# Patient Record
Sex: MALE | Race: WHITE | NOT HISPANIC OR LATINO | Employment: OTHER | URBAN - METROPOLITAN AREA
[De-identification: names, ages, dates, MRNs, and addresses within clinical notes are randomized per-mention and may not be internally consistent; named-entity substitution may affect disease eponyms.]

---

## 2023-04-05 ENCOUNTER — TELEPHONE (OUTPATIENT)
Dept: PRIMARY CARE | Facility: CLINIC | Age: 63
End: 2023-04-05
Payer: COMMERCIAL

## 2023-04-05 NOTE — TELEPHONE ENCOUNTER
Pt left a msg stating that he saw ortho, did PT, got an injection 3 weeks ago with no relief.  Dr. Ya said that he needs to see a back specialist.  The pt is asking for a referral to either Dr. Hemanth Prasad or Dr. Kaz Velasco at the [facility].

## 2023-04-07 DIAGNOSIS — M54.50 CHRONIC LOW BACK PAIN, UNSPECIFIED BACK PAIN LATERALITY, UNSPECIFIED WHETHER SCIATICA PRESENT: ICD-10-CM

## 2023-04-07 DIAGNOSIS — G89.29 CHRONIC LOW BACK PAIN, UNSPECIFIED BACK PAIN LATERALITY, UNSPECIFIED WHETHER SCIATICA PRESENT: ICD-10-CM

## 2023-04-17 DIAGNOSIS — F41.9 ANXIETY: Primary | ICD-10-CM

## 2023-04-17 RX ORDER — ESCITALOPRAM OXALATE 20 MG/1
TABLET ORAL
Qty: 90 TABLET | Refills: 3 | Status: SHIPPED | OUTPATIENT
Start: 2023-04-17 | End: 2024-04-10

## 2023-04-29 DIAGNOSIS — R10.13 EPIGASTRIC PAIN: ICD-10-CM

## 2023-05-01 RX ORDER — PANTOPRAZOLE SODIUM 40 MG/1
TABLET, DELAYED RELEASE ORAL
Qty: 90 TABLET | Refills: 2 | Status: SHIPPED | OUTPATIENT
Start: 2023-05-01 | End: 2024-03-08 | Stop reason: WASHOUT

## 2023-06-14 ENCOUNTER — APPOINTMENT (OUTPATIENT)
Dept: PRIMARY CARE | Facility: CLINIC | Age: 63
End: 2023-06-14
Payer: COMMERCIAL

## 2023-08-09 ENCOUNTER — HOSPITAL ENCOUNTER (OUTPATIENT)
Dept: DATA CONVERSION | Facility: HOSPITAL | Age: 63
Discharge: HOME | End: 2023-08-09

## 2023-08-09 DIAGNOSIS — M25.552 PAIN IN LEFT HIP: ICD-10-CM

## 2023-08-09 DIAGNOSIS — M87.052 IDIOPATHIC ASEPTIC NECROSIS OF LEFT FEMUR (MULTI): ICD-10-CM

## 2023-08-11 ENCOUNTER — HOSPITAL ENCOUNTER (OUTPATIENT)
Dept: DATA CONVERSION | Facility: HOSPITAL | Age: 63
Discharge: HOME | End: 2023-08-11

## 2023-08-11 DIAGNOSIS — M87.9 OSTEONECROSIS, UNSPECIFIED (MULTI): ICD-10-CM

## 2023-08-11 DIAGNOSIS — M87.052 IDIOPATHIC ASEPTIC NECROSIS OF LEFT FEMUR (MULTI): ICD-10-CM

## 2023-09-07 ENCOUNTER — TELEPHONE (OUTPATIENT)
Dept: PRIMARY CARE | Facility: CLINIC | Age: 63
End: 2023-09-07
Payer: COMMERCIAL

## 2023-09-07 NOTE — TELEPHONE ENCOUNTER
Pt left a msg stating that he is having a burning sensation on the top of his little toe.  Should he be concerned?  He does have a derm appt in December.

## 2023-11-07 ENCOUNTER — OFFICE VISIT (OUTPATIENT)
Dept: ORTHOPEDIC SURGERY | Facility: CLINIC | Age: 63
End: 2023-11-07
Payer: COMMERCIAL

## 2023-11-07 ENCOUNTER — ANCILLARY PROCEDURE (OUTPATIENT)
Dept: RADIOLOGY | Facility: CLINIC | Age: 63
End: 2023-11-07
Payer: COMMERCIAL

## 2023-11-07 VITALS — BODY MASS INDEX: 26.6 KG/M2 | HEIGHT: 71 IN | WEIGHT: 190 LBS

## 2023-11-07 DIAGNOSIS — M25.552 LEFT HIP PAIN: ICD-10-CM

## 2023-11-07 DIAGNOSIS — Z96.642 STATUS POST LEFT HIP REPLACEMENT: Primary | ICD-10-CM

## 2023-11-07 PROCEDURE — 1036F TOBACCO NON-USER: CPT | Performed by: ORTHOPAEDIC SURGERY

## 2023-11-07 PROCEDURE — 73502 X-RAY EXAM HIP UNI 2-3 VIEWS: CPT | Mod: LT,FY

## 2023-11-07 PROCEDURE — 73502 X-RAY EXAM HIP UNI 2-3 VIEWS: CPT | Mod: LEFT SIDE | Performed by: RADIOLOGY

## 2023-11-07 PROCEDURE — 99024 POSTOP FOLLOW-UP VISIT: CPT | Performed by: ORTHOPAEDIC SURGERY

## 2023-11-07 RX ORDER — ATORVASTATIN CALCIUM 10 MG/1
10 TABLET, FILM COATED ORAL DAILY
COMMUNITY

## 2023-11-07 RX ORDER — METOPROLOL SUCCINATE 50 MG/1
50 TABLET, EXTENDED RELEASE ORAL DAILY
COMMUNITY
End: 2024-01-11 | Stop reason: SDUPTHER

## 2023-11-07 ASSESSMENT — PAIN - FUNCTIONAL ASSESSMENT: PAIN_FUNCTIONAL_ASSESSMENT: NO/DENIES PAIN

## 2023-11-07 NOTE — PROGRESS NOTES
ORTHOPEDIC TOTAL JOINT  POST-OPERATIVE FOLLOW UP      ============================  IMPRESSION/PLAN:  ============================  63 y.o. male s/p Left Total Hip Replacement completed on 08/11/2023    PLAN:  -Patient is here 3 months out from his hip replacement.  Doing excellent at this time.  X-rays reviewed with him demonstrating stable implants.  Recommend he continue with activities as tolerated while using pain as a guide.  11 follow-up in 1 year megan of surgery with new x-rays      Shankar Torres presents today for follow up of joint replacement above. Pain controlled with current treatment plan. Patient has completed physical therapy. They are currently doing therapy at home on his own. They are currently ambulating with  no assistance .     Review of Systems:   Constitutional: See HPI for pain assessment, No significant weight loss, recent trauma. Denies fevers/chills  Cardiovascular: No chest pain, shortness of breath  Respiratory: No difficulty breathing, cough  Gastrointestinal: No nausea, vomiting, diarrhea, constipation  Musculoskeletal: Noted in HPI, no arthralgias   Integumentary: No rashes, easy bruising, redness   Neurological: no numbness or tingling in extremities, no gait disturbances     There is no problem list on file for this patient.      =================================  EXAM  =================================  GENERAL: A/Ox3, NAD. Appears healthy, well nourished  CARDIAC: regular rate  LUNGS: Breathing non-labored    MUSCULOSKELETAL:  Laterality: left Hip exam  - Strength: Abduction 5/5, Flexion 5/5  - Palpation: non TTP along surgical site  - Incision: healed  - EHL/PF/DF motor intact  - compartments soft, negative homans  - Gait: using no assistive device    NEUROVASCULAR:  - Neurovascular Status: sensation intact to light touch distally  - Capillary refill brisk at extremities, Bilateral dorsalis pedis pulse 2+     IMAGING: Multiple views of the left hip and pelvis reviewed today  and compared to prior films which demonstrates no signs of loosening or failure of left total of arthroplasty. X-rays were personally reviewed by me.  Radiology reports were reviewed by me as well, if available at the time.        Neil Vizcaino DO  Attending Surgeon  Joint Replacement and Adult Reconstructive Surgery  [place], [place]

## 2023-12-18 DIAGNOSIS — E78.5 HYPERLIPIDEMIA, UNSPECIFIED HYPERLIPIDEMIA TYPE: Primary | ICD-10-CM

## 2023-12-19 RX ORDER — ROSUVASTATIN CALCIUM 5 MG/1
5 TABLET, COATED ORAL DAILY
Qty: 90 TABLET | Refills: 0 | Status: SHIPPED | OUTPATIENT
Start: 2023-12-19 | End: 2024-03-18

## 2024-01-11 ENCOUNTER — TELEPHONE (OUTPATIENT)
Dept: CARDIOLOGY | Facility: CLINIC | Age: 64
End: 2024-01-11
Payer: COMMERCIAL

## 2024-01-11 DIAGNOSIS — I25.10 CORONARY ARTERY DISEASE INVOLVING NATIVE HEART, UNSPECIFIED VESSEL OR LESION TYPE, UNSPECIFIED WHETHER ANGINA PRESENT: Primary | ICD-10-CM

## 2024-01-11 RX ORDER — METOPROLOL SUCCINATE 50 MG/1
50 TABLET, EXTENDED RELEASE ORAL DAILY
Qty: 90 TABLET | Refills: 2 | Status: SHIPPED | OUTPATIENT
Start: 2024-01-11

## 2024-03-08 ENCOUNTER — OFFICE VISIT (OUTPATIENT)
Dept: PRIMARY CARE | Facility: CLINIC | Age: 64
End: 2024-03-08
Payer: COMMERCIAL

## 2024-03-08 VITALS
DIASTOLIC BLOOD PRESSURE: 88 MMHG | TEMPERATURE: 97.9 F | SYSTOLIC BLOOD PRESSURE: 138 MMHG | BODY MASS INDEX: 26.36 KG/M2 | WEIGHT: 189 LBS

## 2024-03-08 DIAGNOSIS — F41.9 ANXIETY: ICD-10-CM

## 2024-03-08 DIAGNOSIS — R20.2 PARESTHESIA OF RIGHT FOOT: Primary | ICD-10-CM

## 2024-03-08 DIAGNOSIS — E78.5 HYPERLIPIDEMIA, UNSPECIFIED HYPERLIPIDEMIA TYPE: ICD-10-CM

## 2024-03-08 DIAGNOSIS — I10 PRIMARY HYPERTENSION: ICD-10-CM

## 2024-03-08 DIAGNOSIS — R73.01 ELEVATED FASTING BLOOD SUGAR: ICD-10-CM

## 2024-03-08 DIAGNOSIS — I25.10 ARTERIOSCLEROSIS OF CORONARY ARTERY: ICD-10-CM

## 2024-03-08 DIAGNOSIS — Z12.11 ENCOUNTER FOR SCREENING FOR MALIGNANT NEOPLASM OF COLON: ICD-10-CM

## 2024-03-08 DIAGNOSIS — I47.29 NSVT (NONSUSTAINED VENTRICULAR TACHYCARDIA) (MULTI): ICD-10-CM

## 2024-03-08 DIAGNOSIS — Z00.00 WELL ADULT EXAM: ICD-10-CM

## 2024-03-08 PROCEDURE — 3079F DIAST BP 80-89 MM HG: CPT | Performed by: INTERNAL MEDICINE

## 2024-03-08 PROCEDURE — 1036F TOBACCO NON-USER: CPT | Performed by: INTERNAL MEDICINE

## 2024-03-08 PROCEDURE — 99214 OFFICE O/P EST MOD 30 MIN: CPT | Performed by: INTERNAL MEDICINE

## 2024-03-08 PROCEDURE — 3075F SYST BP GE 130 - 139MM HG: CPT | Performed by: INTERNAL MEDICINE

## 2024-03-08 ASSESSMENT — PATIENT HEALTH QUESTIONNAIRE - PHQ9
SUM OF ALL RESPONSES TO PHQ9 QUESTIONS 1 AND 2: 0
2. FEELING DOWN, DEPRESSED OR HOPELESS: NOT AT ALL
1. LITTLE INTEREST OR PLEASURE IN DOING THINGS: NOT AT ALL

## 2024-03-08 NOTE — PROGRESS NOTES
Subjective   Patient ID: Shankar Torres is a 63 y.o. male who presents for tingling in toes x months.    HPI     Overall well   #1 anxiety-remains an issue  #2 lipids-on treatment without difficulty.  Diet fair exercise  #3 leukopenia-no infections.  No fever chills night sweats.  #4 ifbs-increased sugar.  Increase alcohol.    Review of Systems  All systems reviewed and negative except as per history of present illness  Objective   /88   Temp 36.6 °C (97.9 °F)   Wt 85.7 kg (189 lb)   BMI 26.36 kg/m²     Physical Exam  Constitutional:       Appearance: Normal appearance.   Cardiovascular:      Rate and Rhythm: Normal rate and regular rhythm.      Pulses: Normal pulses.      Heart sounds: No murmur heard.     No gallop.   Pulmonary:      Effort: Pulmonary effort is normal. No respiratory distress.      Breath sounds: Normal breath sounds. No wheezing, rhonchi or rales.   Neurological:      Mental Status: He is alert.   Psychiatric:         Mood and Affect: Mood normal.         Behavior: Behavior normal.         Thought Content: Thought content normal.         Judgment: Judgment normal.         Assessment/Plan       Paraesthesia- reviewed.  Suspect tarsal tunnel.  f/u pending w/ podiatry.      #1 anxiety disorder- fair control. con't Lexapro to 20 mg.  Recommend patient work with psychiatry and psychology.  Reviewed.  Declines.  #2 hyperlipidemia-labs  #3 leukopenia-retest  #4 impaired fasting sugar- good. Diet and exercise stressed. Low sugar reviewed. Recheck   #5 CAD-mild. non-obstuctive. Clinically stable. Follow-up cardiology  #6 nonsustained V. tach-clinically stable. Follow-up cardiology  #7 ED-we will transition to generic treatment. Reviewed side effects of treatment. Reviewed interaction with nitroglycerin  #8 sclerosing kuzbmlgjjjtf-kfpgdm-gh GI. Clinically doing very well  #9 pelvic floor- con't to f/u w/ PT.   #10 floaters- f/u eyeMD  #11 VT- good, s/p ablation. f/u cards.  #12 insomnia-  reviewed. c/s melatonin   #13 dyspepsia- reviewed. Begin PPI. Consult GI. labs  #14 macrocytic anemia- labs today.     Reviewed importance of reducing/reviewed symptoms of withdrawal.     psa --> ordered.   colo 2014--> 2024.  Orders entered, reviewed importance of the patient  s/p shingrix 2/2 2018

## 2024-03-10 PROBLEM — E78.5 HYPERLIPIDEMIA: Status: ACTIVE | Noted: 2024-03-10

## 2024-03-10 PROBLEM — R73.01 ELEVATED FASTING BLOOD SUGAR: Status: ACTIVE | Noted: 2024-03-10

## 2024-03-10 PROBLEM — I10 HTN (HYPERTENSION): Status: ACTIVE | Noted: 2024-03-10

## 2024-03-10 PROBLEM — N52.9 ERECTILE DYSFUNCTION: Status: ACTIVE | Noted: 2024-03-10

## 2024-03-10 PROBLEM — I25.10 ARTERIOSCLEROSIS OF CORONARY ARTERY: Status: ACTIVE | Noted: 2024-03-10

## 2024-03-10 PROBLEM — F41.9 ANXIETY: Status: ACTIVE | Noted: 2024-03-10

## 2024-03-10 PROBLEM — M23.301 DEGENERATIVE TEAR OF LATERAL MENISCUS OF LEFT KNEE: Status: ACTIVE | Noted: 2024-03-10

## 2024-03-18 DIAGNOSIS — E78.5 HYPERLIPIDEMIA, UNSPECIFIED HYPERLIPIDEMIA TYPE: ICD-10-CM

## 2024-03-18 RX ORDER — ROSUVASTATIN CALCIUM 5 MG/1
TABLET, COATED ORAL
Qty: 90 TABLET | Refills: 3 | Status: SHIPPED | OUTPATIENT
Start: 2024-03-18

## 2024-04-09 DIAGNOSIS — F41.9 ANXIETY: ICD-10-CM

## 2024-04-10 RX ORDER — ESCITALOPRAM OXALATE 20 MG/1
TABLET ORAL
Qty: 90 TABLET | Refills: 3 | Status: SHIPPED | OUTPATIENT
Start: 2024-04-10

## 2024-04-23 ENCOUNTER — LAB (OUTPATIENT)
Dept: LAB | Facility: LAB | Age: 64
End: 2024-04-23
Payer: COMMERCIAL

## 2024-04-23 DIAGNOSIS — R20.2 PARESTHESIA OF RIGHT FOOT: ICD-10-CM

## 2024-04-23 DIAGNOSIS — Z00.00 WELL ADULT EXAM: ICD-10-CM

## 2024-04-23 LAB
ALT SERPL W P-5'-P-CCNC: 17 U/L (ref 10–52)
ANION GAP SERPL CALC-SCNC: 10 MMOL/L (ref 10–20)
BUN SERPL-MCNC: 11 MG/DL (ref 6–23)
CALCIUM SERPL-MCNC: 9.1 MG/DL (ref 8.6–10.3)
CHLORIDE SERPL-SCNC: 102 MMOL/L (ref 98–107)
CHOLEST SERPL-MCNC: 212 MG/DL (ref 0–199)
CHOLESTEROL/HDL RATIO: 2.4
CO2 SERPL-SCNC: 31 MMOL/L (ref 21–32)
CREAT SERPL-MCNC: 0.73 MG/DL (ref 0.5–1.3)
EGFRCR SERPLBLD CKD-EPI 2021: >90 ML/MIN/1.73M*2
ERYTHROCYTE [DISTWIDTH] IN BLOOD BY AUTOMATED COUNT: 12.7 % (ref 11.5–14.5)
EST. AVERAGE GLUCOSE BLD GHB EST-MCNC: 128 MG/DL
GLUCOSE SERPL-MCNC: 82 MG/DL (ref 74–99)
HBA1C MFR BLD: 6.1 %
HCT VFR BLD AUTO: 45.6 % (ref 41–52)
HDLC SERPL-MCNC: 89.1 MG/DL
HGB BLD-MCNC: 14.7 G/DL (ref 13.5–17.5)
LDLC SERPL CALC-MCNC: 104 MG/DL
MCH RBC QN AUTO: 32.9 PG (ref 26–34)
MCHC RBC AUTO-ENTMCNC: 32.2 G/DL (ref 32–36)
MCV RBC AUTO: 102 FL (ref 80–100)
NON HDL CHOLESTEROL: 123 MG/DL (ref 0–149)
NRBC BLD-RTO: 0 /100 WBCS (ref 0–0)
PLATELET # BLD AUTO: 304 X10*3/UL (ref 150–450)
POTASSIUM SERPL-SCNC: 4.6 MMOL/L (ref 3.5–5.3)
PSA SERPL-MCNC: 1.69 NG/ML
RBC # BLD AUTO: 4.47 X10*6/UL (ref 4.5–5.9)
SODIUM SERPL-SCNC: 138 MMOL/L (ref 136–145)
TRIGL SERPL-MCNC: 96 MG/DL (ref 0–149)
TSH SERPL-ACNC: 1.53 MIU/L (ref 0.44–3.98)
VIT B12 SERPL-MCNC: 209 PG/ML (ref 211–911)
VLDL: 19 MG/DL (ref 0–40)
WBC # BLD AUTO: 4.3 X10*3/UL (ref 4.4–11.3)

## 2024-04-23 PROCEDURE — 36415 COLL VENOUS BLD VENIPUNCTURE: CPT

## 2024-04-23 PROCEDURE — 84443 ASSAY THYROID STIM HORMONE: CPT

## 2024-04-23 PROCEDURE — 84460 ALANINE AMINO (ALT) (SGPT): CPT

## 2024-04-23 PROCEDURE — 80061 LIPID PANEL: CPT

## 2024-04-23 PROCEDURE — 83036 HEMOGLOBIN GLYCOSYLATED A1C: CPT

## 2024-04-23 PROCEDURE — 84153 ASSAY OF PSA TOTAL: CPT

## 2024-04-23 PROCEDURE — 80048 BASIC METABOLIC PNL TOTAL CA: CPT

## 2024-04-23 PROCEDURE — 82607 VITAMIN B-12: CPT

## 2024-04-23 PROCEDURE — 85027 COMPLETE CBC AUTOMATED: CPT

## 2024-05-10 ENCOUNTER — TELEPHONE (OUTPATIENT)
Dept: PRIMARY CARE | Facility: CLINIC | Age: 64
End: 2024-05-10
Payer: COMMERCIAL

## 2024-05-10 NOTE — TELEPHONE ENCOUNTER
Pt left a msg stating that he has an appt on 6/14 to discuss his B-12 and A1C.  He wanted to know if there was anything he was to do until his appt.

## 2024-06-03 ENCOUNTER — TELEPHONE (OUTPATIENT)
Dept: PRIMARY CARE | Facility: CLINIC | Age: 64
End: 2024-06-03
Payer: COMMERCIAL

## 2024-06-03 DIAGNOSIS — Z12.11 COLON CANCER SCREENING: ICD-10-CM

## 2024-06-14 ENCOUNTER — APPOINTMENT (OUTPATIENT)
Dept: PRIMARY CARE | Facility: CLINIC | Age: 64
End: 2024-06-14
Payer: COMMERCIAL

## 2024-06-14 VITALS
DIASTOLIC BLOOD PRESSURE: 84 MMHG | TEMPERATURE: 97.3 F | BODY MASS INDEX: 26.44 KG/M2 | SYSTOLIC BLOOD PRESSURE: 162 MMHG | WEIGHT: 189.6 LBS

## 2024-06-14 DIAGNOSIS — F41.9 ANXIETY: ICD-10-CM

## 2024-06-14 DIAGNOSIS — I47.29 NSVT (NONSUSTAINED VENTRICULAR TACHYCARDIA) (MULTI): ICD-10-CM

## 2024-06-14 DIAGNOSIS — I10 PRIMARY HYPERTENSION: ICD-10-CM

## 2024-06-14 DIAGNOSIS — E53.8 B12 DEFICIENCY: ICD-10-CM

## 2024-06-14 DIAGNOSIS — E78.5 HYPERLIPIDEMIA, UNSPECIFIED HYPERLIPIDEMIA TYPE: ICD-10-CM

## 2024-06-14 DIAGNOSIS — R73.01 ELEVATED FASTING BLOOD SUGAR: Primary | ICD-10-CM

## 2024-06-14 PROCEDURE — 99214 OFFICE O/P EST MOD 30 MIN: CPT | Performed by: INTERNAL MEDICINE

## 2024-06-14 PROCEDURE — 3079F DIAST BP 80-89 MM HG: CPT | Performed by: INTERNAL MEDICINE

## 2024-06-14 PROCEDURE — 1036F TOBACCO NON-USER: CPT | Performed by: INTERNAL MEDICINE

## 2024-06-14 PROCEDURE — 3077F SYST BP >= 140 MM HG: CPT | Performed by: INTERNAL MEDICINE

## 2024-06-14 RX ORDER — LANOLIN ALCOHOL/MO/W.PET/CERES
1000 CREAM (GRAM) TOPICAL DAILY
COMMUNITY

## 2024-06-14 RX ORDER — METFORMIN HYDROCHLORIDE 500 MG/1
500 TABLET ORAL
Qty: 200 TABLET | Refills: 3 | Status: SHIPPED | OUTPATIENT
Start: 2024-06-14 | End: 2025-07-19

## 2024-06-14 ASSESSMENT — PATIENT HEALTH QUESTIONNAIRE - PHQ9
1. LITTLE INTEREST OR PLEASURE IN DOING THINGS: NOT AT ALL
2. FEELING DOWN, DEPRESSED OR HOPELESS: NOT AT ALL
SUM OF ALL RESPONSES TO PHQ9 QUESTIONS 1 AND 2: 0

## 2024-06-14 NOTE — PROGRESS NOTES
Subjective   Patient ID: Shankar Torres is a 63 y.o. male who presents for Follow-up (Lab results).    HPI     Overall well   #1 anxiety-remains an issue  #2 lipids-on treatment without difficulty.  Diet fair exercise  #3 leukopenia-no infections.  No fever chills night sweats.  #4 ifbs-increased sugar.  Increase alcohol.    Review of Systems  All systems reviewed and negative except as per history of present illness  Objective   /84   Temp 36.3 °C (97.3 °F)   Wt 86 kg (189 lb 9.6 oz)   BMI 26.44 kg/m²     Physical Exam  Constitutional:       Appearance: Normal appearance.   Cardiovascular:      Rate and Rhythm: Normal rate and regular rhythm.      Pulses: Normal pulses.      Heart sounds: No murmur heard.     No gallop.   Pulmonary:      Effort: Pulmonary effort is normal. No respiratory distress.      Breath sounds: Normal breath sounds. No wheezing, rhonchi or rales.   Neurological:      Mental Status: He is alert.   Psychiatric:         Mood and Affect: Mood normal.         Behavior: Behavior normal.         Thought Content: Thought content normal.         Judgment: Judgment normal.       Lab Results   Component Value Date    WBC 4.3 (L) 04/23/2024    HGB 14.7 04/23/2024    HCT 45.6 04/23/2024     04/23/2024    CHOL 212 (H) 04/23/2024    TRIG 96 04/23/2024    HDL 89.1 04/23/2024    ALT 17 04/23/2024    AST 20 07/24/2023     04/23/2024    K 4.6 04/23/2024     04/23/2024    CREATININE 0.73 04/23/2024    BUN 11 04/23/2024    CO2 31 04/23/2024    TSH 1.53 04/23/2024    PSA 1.69 04/23/2024    INR 0.9 07/24/2023    HGBA1C 6.1 (H) 04/23/2024     Lab Results   Component Value Date    [id number] 209 (L) 04/23/2024         Assessment/Plan       Paraesthesia- reviewed.  Suspect tarsal tunnel.  f/u pending w/ podiatry.      #1 anxiety disorder- fair control. con't Lexapro to 20 mg.  Recommend patient work with psychiatry and psychology.  Reviewed.  Declines.  C/s Buspar - declines today.   #2  hyperlipidemia- good. f/u  cards  #3 leukopenia-retest  #4 impaired fasting sugar- stable. Diet and exercise stressed. Reduce EtOH.  Begin metformin 500 bidLow sugar reviewed. Recheck   #5 CAD-mild. non-obstuctive. Clinically stable. Follow-up cardiology  #6 nonsustained V. tach-clinically stable. Follow-up cardiology  #7 ED-we will transition to generic treatment. Reviewed side effects of treatment. Reviewed interaction with nitroglycerin  #8 sclerosing kqyodxggejki-tfyvbe-zh GI. Clinically doing very well  #9 pelvic floor- con't to f/u w/ PT.   #10 floaters- f/u eyeMD  #11 VT- good, s/p ablation. f/u cards.  #12 insomnia- reviewed. c/s melatonin   #13 dyspepsia- reviewed. Begin PPI. Consult GI. labs  #14 macrocytic anemia- follow as rx b12  #15 b12- on OTC 1,000 mcg.  Retest.  IM pending.     Reviewed importance of reducing/reviewed symptoms of withdrawal.     colo 2014--> 2024.  Orders entered, reviewed importance of the patient  s/p shingrix 2/2 2018   f/u  3 mths

## 2024-07-26 ENCOUNTER — LAB (OUTPATIENT)
Dept: LAB | Facility: LAB | Age: 64
End: 2024-07-26
Payer: COMMERCIAL

## 2024-07-26 DIAGNOSIS — E53.8 B12 DEFICIENCY: ICD-10-CM

## 2024-07-26 LAB
FOLATE SERPL-MCNC: 10 NG/ML
VIT B12 SERPL-MCNC: 422 PG/ML (ref 211–911)

## 2024-07-26 PROCEDURE — 86255 FLUORESCENT ANTIBODY SCREEN: CPT

## 2024-07-26 PROCEDURE — 82746 ASSAY OF FOLIC ACID SERUM: CPT

## 2024-07-26 PROCEDURE — 86340 INTRINSIC FACTOR ANTIBODY: CPT

## 2024-07-26 PROCEDURE — 82607 VITAMIN B-12: CPT

## 2024-07-26 PROCEDURE — 36415 COLL VENOUS BLD VENIPUNCTURE: CPT

## 2024-07-29 LAB — PCA IGG SER-ACNC: 2.2 UNITS (ref 0–24.9)

## 2024-07-30 LAB — IF BLOCK AB SER QL RIA: NEGATIVE

## 2024-08-01 ENCOUNTER — OFFICE VISIT (OUTPATIENT)
Dept: CARDIOLOGY | Facility: CLINIC | Age: 64
End: 2024-08-01
Payer: COMMERCIAL

## 2024-08-01 VITALS
DIASTOLIC BLOOD PRESSURE: 83 MMHG | WEIGHT: 188.4 LBS | SYSTOLIC BLOOD PRESSURE: 137 MMHG | HEIGHT: 71 IN | HEART RATE: 62 BPM | TEMPERATURE: 98 F | BODY MASS INDEX: 26.38 KG/M2

## 2024-08-01 DIAGNOSIS — I25.10 CORONARY ARTERY DISEASE INVOLVING NATIVE HEART, UNSPECIFIED VESSEL OR LESION TYPE, UNSPECIFIED WHETHER ANGINA PRESENT: Primary | ICD-10-CM

## 2024-08-01 DIAGNOSIS — I47.29 NSVT (NONSUSTAINED VENTRICULAR TACHYCARDIA) (MULTI): ICD-10-CM

## 2024-08-01 DIAGNOSIS — E78.1 HYPERTRIGLYCERIDEMIA: ICD-10-CM

## 2024-08-01 DIAGNOSIS — E78.2 MIXED HYPERLIPIDEMIA: ICD-10-CM

## 2024-08-01 LAB
ATRIAL RATE: 63 BPM
P AXIS: 61 DEGREES
P OFFSET: 179 MS
P ONSET: 121 MS
PR INTERVAL: 162 MS
Q ONSET: 202 MS
QRS COUNT: 10 BEATS
QRS DURATION: 108 MS
QT INTERVAL: 446 MS
QTC CALCULATION(BAZETT): 456 MS
QTC FREDERICIA: 453 MS
R AXIS: -2 DEGREES
T AXIS: 37 DEGREES
T OFFSET: 425 MS
VENTRICULAR RATE: 63 BPM

## 2024-08-01 PROCEDURE — 3079F DIAST BP 80-89 MM HG: CPT | Performed by: NURSE PRACTITIONER

## 2024-08-01 PROCEDURE — 3008F BODY MASS INDEX DOCD: CPT | Performed by: NURSE PRACTITIONER

## 2024-08-01 PROCEDURE — 1036F TOBACCO NON-USER: CPT | Performed by: NURSE PRACTITIONER

## 2024-08-01 PROCEDURE — 99214 OFFICE O/P EST MOD 30 MIN: CPT | Performed by: NURSE PRACTITIONER

## 2024-08-01 PROCEDURE — 3075F SYST BP GE 130 - 139MM HG: CPT | Performed by: NURSE PRACTITIONER

## 2024-08-01 PROCEDURE — 93005 ELECTROCARDIOGRAM TRACING: CPT | Performed by: NURSE PRACTITIONER

## 2024-08-01 RX ORDER — METOPROLOL SUCCINATE 50 MG/1
50 TABLET, EXTENDED RELEASE ORAL DAILY
Qty: 90 TABLET | Refills: 2 | Status: SHIPPED | OUTPATIENT
Start: 2024-08-01

## 2024-08-01 RX ORDER — CHOLECALCIFEROL (VITAMIN D3) 25 MCG
1000 TABLET ORAL DAILY
COMMUNITY

## 2024-08-01 NOTE — PROGRESS NOTES
"Chief Complaint:   CAD     History Of Present Illness:    Shankar Torres is a 64 y.o. male here with for follow-up of coronary artery disease.  He was noted to have minimal CAD in 2009. Cath in 2019 showed moderate, non obstructive CAD. The patient is tolerating guideline-directed medical therapy with antiplatelet and statin medication and is compliant.  The patient exercises regularly (walks and lifts weights) and follows a heart healthy diet.  The patient has been well since their last office appointment and is not having any anginal symptoms or dyspnea on exertion.      Nonsustained VT [RFA] - 9/17/2019  Cath (Moderate non-obstructive CAD) - 7/23/2019  Cath (Normal EF, Minimal CAD) - 12/17/2009  Past Medical History:  He has a past medical history of Anxiety disorder, unspecified (12/04/2013).    Past Surgical History:  He has a past surgical history that includes Hernia repair (08/18/2015); IR injection joint (03/10/2023); and Joint replacement (Left, 08/01/2023).      Social History:  He reports that he has never smoked. He has never used smokeless tobacco. He reports current alcohol use of about 2.0 standard drinks of alcohol per week. He reports that he does not use drugs.    Family History:  Family History   Problem Relation Name Age of Onset    Aneurysm Mother        Allergies:  Patient has no known allergies.    Review of Systems  All pertinent systems have been reviewed and are negative except for what is stated in the history of present illness.    All other systems have been reviewed and are negative and noncontributory to this patient's current ailments.     Visit Vitals  /83   Pulse 62   Temp 36.7 °C (98 °F)   Ht 1.803 m (5' 11\")   Wt 85.5 kg (188 lb 6.4 oz)   BMI 26.28 kg/m²   Smoking Status Never   BSA 2.07 m²     Last Labs:  CBC -  Lab Results   Component Value Date    WBC 4.3 (L) 04/23/2024    HGB 14.7 04/23/2024    HCT 45.6 04/23/2024     (H) 04/23/2024     04/23/2024 "       CMP -  Lab Results   Component Value Date    CALCIUM 9.1 04/23/2024    PROT 7.5 07/24/2023    ALBUMIN 4.5 07/24/2023    AST 20 07/24/2023    ALT 17 04/23/2024    ALKPHOS 77 07/24/2023    BILITOT 0.5 07/24/2023    BUN 11 04/23/2024    CREATININE 0.73 04/23/2024       LIPID PANEL -   Lab Results   Component Value Date    CHOL 212 (H) 04/23/2024    TRIG 96 04/23/2024    HDL 89.1 04/23/2024    CHHDL 2.4 04/23/2024    LDLF 103 (H) 12/21/2022    VLDL 19 04/23/2024    NHDL 123 04/23/2024       RENAL FUNCTION PANEL -   Lab Results   Component Value Date    GLUCOSE 82 04/23/2024     04/23/2024    K 4.6 04/23/2024     04/23/2024    CO2 31 04/23/2024    ANIONGAP 10 04/23/2024    BUN 11 04/23/2024    CREATININE 0.73 04/23/2024    GFRMALE >90 12/21/2022    CALCIUM 9.1 04/23/2024    ALBUMIN 4.5 07/24/2023        Lab Results   Component Value Date    HGBA1C 6.1 (H) 04/23/2024       Objective   Vitals reviewed.   Constitutional:       Appearance: Healthy appearance. Not in distress.   Eyes:      Conjunctiva/sclera: Conjunctivae normal.   Neck:      Vascular: No JVR. JVD normal.   Pulmonary:      Effort: Pulmonary effort is normal.      Breath sounds: Normal breath sounds. No wheezing. No rhonchi. No rales.   Chest:      Chest wall: Not tender to palpatation.   Cardiovascular:      PMI at left midclavicular line. Normal rate. Regular rhythm. Normal S1. Normal S2.       Murmurs: There is no murmur.      No gallop.  No click. No rub.   Edema:     Peripheral edema absent.   Abdominal:      General: Bowel sounds are normal.      Palpations: Abdomen is soft.      Tenderness: There is no abdominal tenderness.   Musculoskeletal: Normal range of motion.         General: No tenderness. Skin:     General: Skin is warm and dry.   Neurological:      General: No focal deficit present.      Mental Status: Alert and oriented to person, place and time.   Psychiatric:         Attention and Perception: Attention normal.         Mood  and Affect: Mood normal.       Assessment/Plan   Diagnoses and all orders for this visit:  Coronary artery disease involving native heart, unspecified vessel or lesion type, unspecified whether angina present  - Exercising regularly without complaint  - EKG NSR at 63bpm, No ST elevation  - continue statin/asa  NSVT (nonsustained ventricular tachycardia) (Multi)  - rare palpitations  - continue metoprolol (BP borderline, if continues to elevate will switch patient to nebivolol)  Hypertriglyceridemia  - off vascepa  - trigs well controlled  Mixed hyperlipidemia  - borderline   - has been eating a lot of eggs, he will decrease  - if lipids do not improve will need to increase statin    Follow up 1 year       Current Outpatient Medications:     aspirin 81 mg chewable tablet, CHEW 1 TABLET BY MOUTH TWO TIMES A DAY FOR 4 WEEKS TO PREVENT BLOOD CLOTS, Disp: 56 tablet, Rfl: 0    cholecalciferol (Vitamin D3) 25 MCG (1000 UT) tablet, Take 1 tablet (1,000 Units) by mouth once daily., Disp: , Rfl:     cyanocobalamin (Vitamin B-12) 1,000 mcg tablet, Take 1 tablet (1,000 mcg) by mouth once daily., Disp: , Rfl:     escitalopram (Lexapro) 20 mg tablet, TAKE 1 TABLET DAILY, Disp: 90 tablet, Rfl: 3    pantoprazole (ProtoNix) 40 mg EC tablet, TAKE 1 TABLET BY MOUTH ONCE DAILY AS NEEDED FOR HEARTBURN, Disp: 30 tablet, Rfl: 0    rosuvastatin (Crestor) 5 mg tablet, TAKE 1 TABLET ONCE DAILY (FOLLOW UP APPOINTMENT/LABS FOR FURTHER REFILLS), Disp: 90 tablet, Rfl: 3    metoprolol succinate XL (Toprol-XL) 50 mg 24 hr tablet, Take 1 tablet (50 mg) by mouth once daily. Do not crush or chew., Disp: 90 tablet, Rfl: 2    Exclusive of any other services or procedures performed, I, Zo QUINONEZ, spent 30 minutes in duration for this visit today.  This time consisted of chart review, obtaining history, and/or performing the exam as documented above, as well as, documenting the clinical information for the encounter in the electronic record,  discussing treatment options, plans, and/or goals with patient, family, and/or caregiver, refilling medications, updating the electronic record, ordering medicines, lab work, imaging, referrals, and/or procedures as documented above and communicating with other [facility] professionals. I have discussed the results of laboratory, radiology, and cardiology studies with the patient and their family/caregiver.

## 2024-08-13 ENCOUNTER — APPOINTMENT (OUTPATIENT)
Dept: ORTHOPEDIC SURGERY | Facility: CLINIC | Age: 64
End: 2024-08-13
Payer: COMMERCIAL

## 2024-08-20 ENCOUNTER — HOSPITAL ENCOUNTER (OUTPATIENT)
Dept: RADIOLOGY | Facility: CLINIC | Age: 64
Discharge: HOME | End: 2024-08-20
Payer: COMMERCIAL

## 2024-08-20 ENCOUNTER — APPOINTMENT (OUTPATIENT)
Dept: ORTHOPEDIC SURGERY | Facility: CLINIC | Age: 64
End: 2024-08-20
Payer: COMMERCIAL

## 2024-08-20 VITALS — HEIGHT: 71 IN | WEIGHT: 188 LBS | BODY MASS INDEX: 26.32 KG/M2

## 2024-08-20 DIAGNOSIS — Z96.642 STATUS POST LEFT HIP REPLACEMENT: Primary | ICD-10-CM

## 2024-08-20 DIAGNOSIS — M25.552 LEFT HIP PAIN: ICD-10-CM

## 2024-08-20 PROCEDURE — 73502 X-RAY EXAM HIP UNI 2-3 VIEWS: CPT | Mod: LEFT SIDE | Performed by: RADIOLOGY

## 2024-08-20 PROCEDURE — 3008F BODY MASS INDEX DOCD: CPT | Performed by: ORTHOPAEDIC SURGERY

## 2024-08-20 PROCEDURE — 99024 POSTOP FOLLOW-UP VISIT: CPT | Performed by: ORTHOPAEDIC SURGERY

## 2024-08-20 PROCEDURE — 73502 X-RAY EXAM HIP UNI 2-3 VIEWS: CPT | Mod: LT

## 2024-08-20 PROCEDURE — 1036F TOBACCO NON-USER: CPT | Performed by: ORTHOPAEDIC SURGERY

## 2024-08-20 NOTE — PROGRESS NOTES
ORTHOPEDIC TOTAL JOINT  POST-OPERATIVE FOLLOW UP      ============================  IMPRESSION/PLAN:  ============================  64 y.o. male s/p Left Total Hip Replacement completed on 08/11/2023    PLAN:  -Patient is now 1 year out from surgery doing excellent this time.  Has no issues or complaints.  Current x-rays reviewed with him demonstrating stable implants.  Will follow-up as needed at this point.    Shankar Torres presents today for follow up of joint replacement above. Pain controlled with current treatment plan. Patient has completed physical therapy. They are currently doing therapy at home on his own. They are currently ambulating with  no assistance . He is doing well and has no concern. XR done today.     Review of Systems:   Constitutional: See HPI for pain assessment, No significant weight loss, recent trauma. Denies fevers/chills  Cardiovascular: No chest pain, shortness of breath  Respiratory: No difficulty breathing, cough  Gastrointestinal: No nausea, vomiting, diarrhea, constipation  Musculoskeletal: Noted in HPI, no arthralgias   Integumentary: No rashes, easy bruising, redness   Neurological: no numbness or tingling in extremities, no gait disturbances     Patient Active Problem List   Diagnosis    NSVT (nonsustained ventricular tachycardia) (Multi)    Anxiety    Arteriosclerosis of coronary artery    Degenerative tear of lateral meniscus of left knee    Elevated fasting blood sugar    Erectile dysfunction    HTN (hypertension)    Hyperlipidemia       =================================  EXAM  =================================  GENERAL: A/Ox3, NAD. Appears healthy, well nourished  CARDIAC: regular rate  LUNGS: Breathing non-labored    MUSCULOSKELETAL:  Laterality: left Hip exam  - Strength: Abduction 5/5, Flexion 5/5  - Palpation: non TTP along surgical site  - Incision: healed  - EHL/PF/DF motor intact  - compartments soft, negative homans  - Gait: using no assistive  device    NEUROVASCULAR:  - Neurovascular Status: sensation intact to light touch distally  - Capillary refill brisk at extremities, Bilateral dorsalis pedis pulse 2+     IMAGING: Multiple views of the left hip and pelvis reviewed today and compared to prior films which demonstrates no signs of loosening or failure of left total of arthroplasty. X-rays were personally reviewed by me.  Radiology reports were reviewed by me as well, if available at the time.        Neil Vizcaino DO  Attending Surgeon  Joint Replacement and Adult Reconstructive Surgery  [place], [place]

## 2024-08-20 NOTE — PROGRESS NOTES
"ORTHOPEDIC TOTAL JOINT  POST-OPERATIVE FOLLOW UP      ============================  IMPRESSION/PLAN:  ============================  64 y.o. male s/p {TOTAL JOINT:25500} completed on ***    PLAN:  -Weightbearing: {WEIGHT BEARING STATUS:02236::\"Weight bearing instructions not necessary at this time\"}  -DVT Prophylaxis: ***  -Radiology Studies: ***  -Therapies: Continue PT/OT  -Transition off assistive device as seen fit by patient and therapy  -Follow-up: ***        Shankar Torres presents today for follow up of joint replacement above. Pain controlled with current treatment plan. Patient {HAS HAS NOT:85753} begun physical therapy. They are currently doing therapy at ***. They are currently ambulating with {aids:2423}.     Review of Systems:   Constitutional: See HPI for pain assessment, No significant weight loss, recent trauma. Denies fevers/chills  Cardiovascular: No chest pain, shortness of breath  Respiratory: No difficulty breathing, cough  Gastrointestinal: No nausea, vomiting, diarrhea, constipation  Musculoskeletal: Noted in HPI, no arthralgias   Integumentary: No rashes, easy bruising, redness   Neurological: no numbness or tingling in extremities, no gait disturbances     Patient Active Problem List   Diagnosis    NSVT (nonsustained ventricular tachycardia) (Multi)    Anxiety    Arteriosclerosis of coronary artery    Degenerative tear of lateral meniscus of left knee    Elevated fasting blood sugar    Erectile dysfunction    HTN (hypertension)    Hyperlipidemia       =================================  EXAM  =================================  ***    IMAGING: ***      Neil Vizcaino DO  Attending Surgeon  Joint Replacement and Adult Reconstructive Surgery  [place], [place]     "

## 2024-09-19 ENCOUNTER — APPOINTMENT (OUTPATIENT)
Dept: PRIMARY CARE | Facility: CLINIC | Age: 64
End: 2024-09-19
Payer: COMMERCIAL

## 2024-09-19 VITALS
SYSTOLIC BLOOD PRESSURE: 134 MMHG | WEIGHT: 192 LBS | BODY MASS INDEX: 26.78 KG/M2 | TEMPERATURE: 97.1 F | DIASTOLIC BLOOD PRESSURE: 80 MMHG

## 2024-09-19 DIAGNOSIS — D75.89 MACROCYTOSIS: ICD-10-CM

## 2024-09-19 DIAGNOSIS — Z23 IMMUNIZATION DUE: ICD-10-CM

## 2024-09-19 DIAGNOSIS — E78.5 HYPERLIPIDEMIA, UNSPECIFIED HYPERLIPIDEMIA TYPE: ICD-10-CM

## 2024-09-19 DIAGNOSIS — E53.8 B12 DEFICIENCY: ICD-10-CM

## 2024-09-19 DIAGNOSIS — R10.13 DYSPEPSIA: ICD-10-CM

## 2024-09-19 DIAGNOSIS — R73.01 ELEVATED FASTING BLOOD SUGAR: Primary | ICD-10-CM

## 2024-09-19 PROCEDURE — 3079F DIAST BP 80-89 MM HG: CPT | Performed by: INTERNAL MEDICINE

## 2024-09-19 PROCEDURE — 3075F SYST BP GE 130 - 139MM HG: CPT | Performed by: INTERNAL MEDICINE

## 2024-09-19 PROCEDURE — 90471 IMMUNIZATION ADMIN: CPT | Performed by: INTERNAL MEDICINE

## 2024-09-19 PROCEDURE — 99214 OFFICE O/P EST MOD 30 MIN: CPT | Performed by: INTERNAL MEDICINE

## 2024-09-19 PROCEDURE — 1036F TOBACCO NON-USER: CPT | Performed by: INTERNAL MEDICINE

## 2024-09-19 PROCEDURE — 90656 IIV3 VACC NO PRSV 0.5 ML IM: CPT | Performed by: INTERNAL MEDICINE

## 2024-09-19 ASSESSMENT — PATIENT HEALTH QUESTIONNAIRE - PHQ9
SUM OF ALL RESPONSES TO PHQ9 QUESTIONS 1 AND 2: 0
1. LITTLE INTEREST OR PLEASURE IN DOING THINGS: NOT AT ALL
2. FEELING DOWN, DEPRESSED OR HOPELESS: NOT AT ALL

## 2024-09-19 NOTE — PROGRESS NOTES
Subjective   Patient ID: Shankar Torres is a 64 y.o. male who presents for No chief complaint on file..    HPI     Overall well   #1 anxiety-remains an issue  #2 lipids-on treatment without difficulty.  Diet fair exercise  #3 leukopenia-no infections.  No fever chills night sweats.  #4 ifbs- diet fair.  Still w/ daily alcohol (~3-5/day).  Little exercise.     Review of Systems  All systems reviewed and negative except as per history of present illness  Objective   There were no vitals taken for this visit.    Physical Exam  Constitutional:       Appearance: Normal appearance.   Cardiovascular:      Rate and Rhythm: Normal rate and regular rhythm.      Pulses: Normal pulses.      Heart sounds: No murmur heard.     No gallop.   Pulmonary:      Effort: Pulmonary effort is normal. No respiratory distress.      Breath sounds: Normal breath sounds. No wheezing, rhonchi or rales.   Neurological:      Mental Status: He is alert.   Psychiatric:         Mood and Affect: Mood normal.         Behavior: Behavior normal.         Thought Content: Thought content normal.         Judgment: Judgment normal.     Lab Results   Component Value Date    WBC 4.3 (L) 04/23/2024    HGB 14.7 04/23/2024    HCT 45.6 04/23/2024     04/23/2024    CHOL 212 (H) 04/23/2024    TRIG 96 04/23/2024    HDL 89.1 04/23/2024    ALT 17 04/23/2024    AST 20 07/24/2023     04/23/2024    K 4.6 04/23/2024     04/23/2024    CREATININE 0.73 04/23/2024    BUN 11 04/23/2024    CO2 31 04/23/2024    TSH 1.53 04/23/2024    PSA 1.69 04/23/2024    INR 0.9 07/24/2023    HGBA1C 6.1 (H) 04/23/2024     Lab Results   Component Value Date    [id number] 422 07/26/2024         Assessment/Plan       Paraesthesia- reviewed.  Suspect tarsal tunnel.  f/u pending w/ podiatry.      #1 anxiety disorder- fair control. con't Lexapro to 20 mg.  Recommend patient work with psychiatry and psychology.  Reviewed.  Declines.  C/s Buspar - declines today.   #2  hyperlipidemia- good. f/u  cards  #3 leukopenia-retest  #4 impaired fasting sugar- stable. Diet and exercise stressed. Reduce EtOH.  Begin metformin 500 bid---> he declines.  Low sugar reviewed. Recheck   #5 CAD-mild. non-obstuctive. Clinically stable. Follow-up cardiology  #6 nonsustained V. tach-clinically stable. Follow-up cardiology  #7 ED- con't  #8 sclerosing zzjgadwodxhi-hxrzfi-wq GI. Clinically doing very well  #9 pelvic floor- con't to f/u w/ PT.   #10 floaters- f/u eyeMD  #11 VT- good, s/p ablation. f/u cards.  #12 insomnia- reviewed. c/s melatonin   #13 dyspepsia- reviewed. Begin PPI. Consult GI. labs  #14 macrocytic anemia- follow as rx b12. Retest.   #15 b12- on OTC 1,000 mcg. Better.  Retest next visit      Reviewed need to discontinue alcohol.  Reviewed my concern over alcohol addiction/alcoholism.  Recommend detox and rehab.  At this point he declines.    Reviewed importance of reducing/reviewed symptoms of withdrawal.     colo 2024    f/u  4 mths   Rec COVID booster

## 2024-10-11 ENCOUNTER — APPOINTMENT (OUTPATIENT)
Dept: PODIATRY | Facility: CLINIC | Age: 64
End: 2024-10-11
Payer: COMMERCIAL

## 2024-10-11 VITALS — WEIGHT: 193 LBS | BODY MASS INDEX: 26.14 KG/M2 | HEIGHT: 72 IN

## 2024-10-11 DIAGNOSIS — M79.674 TOE PAIN, RIGHT: ICD-10-CM

## 2024-10-11 DIAGNOSIS — M79.675 TOE PAIN, LEFT: ICD-10-CM

## 2024-10-11 DIAGNOSIS — G62.9 NEUROPATHY: Primary | ICD-10-CM

## 2024-10-11 DIAGNOSIS — B35.3 TINEA PEDIS OF LEFT FOOT: ICD-10-CM

## 2024-10-11 PROCEDURE — 99202 OFFICE O/P NEW SF 15 MIN: CPT | Performed by: PODIATRIST

## 2024-10-11 PROCEDURE — 1036F TOBACCO NON-USER: CPT | Performed by: PODIATRIST

## 2024-10-11 PROCEDURE — 3008F BODY MASS INDEX DOCD: CPT | Performed by: PODIATRIST

## 2024-10-11 NOTE — PROGRESS NOTES
CC: numbness toes    HPI:  Patient seen for off and on numbness to the 2nd toes as well as pain left 5th toe, had hip replacement in 2023, no acute injury,    PCP: Dr. James  Last visit: 9-19-24     PMH  Past Medical History:   Diagnosis Date    Anxiety disorder, unspecified 12/04/2013    Anxiety disorder     MEDS    Current Outpatient Medications:     cholecalciferol (Vitamin D3) 25 MCG (1000 UT) tablet, Take 1 tablet (1,000 Units) by mouth once daily., Disp: , Rfl:     cyanocobalamin (Vitamin B-12) 1,000 mcg tablet, Take 1 tablet (1,000 mcg) by mouth once daily., Disp: , Rfl:     escitalopram (Lexapro) 20 mg tablet, TAKE 1 TABLET DAILY, Disp: 90 tablet, Rfl: 3    metoprolol succinate XL (Toprol-XL) 50 mg 24 hr tablet, Take 1 tablet (50 mg) by mouth once daily. Do not crush or chew., Disp: 90 tablet, Rfl: 2    pantoprazole (ProtoNix) 40 mg EC tablet, TAKE 1 TABLET BY MOUTH ONCE DAILY AS NEEDED FOR HEARTBURN, Disp: 30 tablet, Rfl: 0    rosuvastatin (Crestor) 5 mg tablet, TAKE 1 TABLET ONCE DAILY (FOLLOW UP APPOINTMENT/LABS FOR FURTHER REFILLS), Disp: 90 tablet, Rfl: 3  Allergies  No Known Allergies  Social History     Socioeconomic History    Marital status:    Tobacco Use    Smoking status: Never    Smokeless tobacco: Never   Substance and Sexual Activity    Alcohol use: Yes     Alcohol/week: 2.0 standard drinks of alcohol     Types: 1 Cans of beer, 1 Shots of liquor per week     Comment: socially    Drug use: Never     Family History   Problem Relation Name Age of Onset    Aneurysm Mother       Past Surgical History:   Procedure Laterality Date    HERNIA REPAIR  08/18/2015    Inguinal Hernia Repair    IR INJECTION JOINT  03/10/2023    IR INJECTION JOINT POR DIAGRAD    JOINT REPLACEMENT Left 08/01/2023       REVIEW OF SYSTEMS    + as noted above in HPI.       Physical examination:   On General Observation: Patient is a pleasant, cooperative, well developed 64 y.o.  adult male. The patient is alert and  oriented to time, place and person. Patient has normal affect and mood.  Ht 1.829 m (6')   Wt 87.5 kg (193 lb)   BMI 26.18 kg/m²     Vascular:  DP and PT pulses are 2/4 b/l.  no edema noted. mild varicosities b/l.  CFT  6 seconds to all digits bilateral.  Skin temperature is warm to warm from proximal to distal bilateral.      Muscular: Strength is 5/5 b/l.  Motor strength is normal and symmetric proximally, as well as distally, in all four extremities. Good mobility of all extremities.  Tenderness to 2nd toes.     Neuro:  Proprioception present.   Sensation to vibration is  present. Protective sensation present  at all pedal sites via [place] Cynthia 5.07 monofilament bilateral.  Light touch decreased 2nd toes bilateral.     Derm:  Maceration is present 4th webspace left foot    Ortho:  ROM is stable 1st mpj, mtj, stj, aj.    ASSESSMENT:    Neuropathy toes  Pain toes  Interdigital tinea pedis     PLAN:   Consult  A comprehensive history and physical examination were preformed. The patient was educated on clinical findings, diagnosis and treatment plans. Patient understands all that has been explained and all questions were answered to apparent satisfaction.   -reviewed etiology of the above and treatment options, recommend ncv/emg, he will wait on this, will start topical capscacin as needed.  Dry well between the toes, will start lotrimin ultra bid for 4 weeks, follow up 4 weeks.        Drake Parsons DPM

## 2024-11-14 ENCOUNTER — APPOINTMENT (OUTPATIENT)
Dept: PODIATRY | Facility: CLINIC | Age: 64
End: 2024-11-14
Payer: COMMERCIAL

## 2024-11-14 DIAGNOSIS — L03.032 CELLULITIS OF LEFT TOE: Primary | ICD-10-CM

## 2024-11-14 DIAGNOSIS — B35.3 TINEA PEDIS OF LEFT FOOT: ICD-10-CM

## 2024-11-14 DIAGNOSIS — L03.032 CELLULITIS OF LEFT TOE: ICD-10-CM

## 2024-11-14 PROCEDURE — 1036F TOBACCO NON-USER: CPT | Performed by: PODIATRIST

## 2024-11-14 PROCEDURE — 99212 OFFICE O/P EST SF 10 MIN: CPT | Performed by: PODIATRIST

## 2024-11-14 RX ORDER — MUPIROCIN 20 MG/G
OINTMENT TOPICAL 2 TIMES DAILY
Qty: 15 G | Refills: 1 | Status: SHIPPED | OUTPATIENT
Start: 2024-11-14 | End: 2024-11-14 | Stop reason: SDUPTHER

## 2024-11-14 RX ORDER — AMOXICILLIN AND CLAVULANATE POTASSIUM 875; 125 MG/1; MG/1
1 TABLET, FILM COATED ORAL 2 TIMES DAILY
Qty: 20 TABLET | Refills: 0 | Status: SHIPPED | OUTPATIENT
Start: 2024-11-14 | End: 2024-11-24

## 2024-11-14 RX ORDER — MUPIROCIN 20 MG/G
OINTMENT TOPICAL 2 TIMES DAILY
Qty: 15 G | Refills: 1 | Status: SHIPPED | OUTPATIENT
Start: 2024-11-14 | End: 2024-11-24

## 2024-11-14 RX ORDER — AMOXICILLIN AND CLAVULANATE POTASSIUM 875; 125 MG/1; MG/1
1 TABLET, FILM COATED ORAL 2 TIMES DAILY
Qty: 20 TABLET | Refills: 0 | Status: SHIPPED | OUTPATIENT
Start: 2024-11-14 | End: 2024-11-14 | Stop reason: SDUPTHER

## 2024-11-14 NOTE — PROGRESS NOTES
CC: numbness toes     HPI:  Patient seen follow up tinea pedis of the foot, used the lotrimin, improved, has some redness to the top of the webspace    PCP: Dr. James  Last visit: 9-19-24      PMH  Medical History        Past Medical History:   Diagnosis Date    Anxiety disorder, unspecified 12/04/2013     Anxiety disorder         MEDS    Current Medications      Current Outpatient Medications:     cholecalciferol (Vitamin D3) 25 MCG (1000 UT) tablet, Take 1 tablet (1,000 Units) by mouth once daily., Disp: , Rfl:     cyanocobalamin (Vitamin B-12) 1,000 mcg tablet, Take 1 tablet (1,000 mcg) by mouth once daily., Disp: , Rfl:     escitalopram (Lexapro) 20 mg tablet, TAKE 1 TABLET DAILY, Disp: 90 tablet, Rfl: 3    metoprolol succinate XL (Toprol-XL) 50 mg 24 hr tablet, Take 1 tablet (50 mg) by mouth once daily. Do not crush or chew., Disp: 90 tablet, Rfl: 2    pantoprazole (ProtoNix) 40 mg EC tablet, TAKE 1 TABLET BY MOUTH ONCE DAILY AS NEEDED FOR HEARTBURN, Disp: 30 tablet, Rfl: 0    rosuvastatin (Crestor) 5 mg tablet, TAKE 1 TABLET ONCE DAILY (FOLLOW UP APPOINTMENT/LABS FOR FURTHER REFILLS), Disp: 90 tablet, Rfl: 3     Allergies  Allergies   No Known Allergies     Social History   Social History            Socioeconomic History    Marital status:    Tobacco Use    Smoking status: Never    Smokeless tobacco: Never   Substance and Sexual Activity    Alcohol use: Yes       Alcohol/week: 2.0 standard drinks of alcohol       Types: 1 Cans of beer, 1 Shots of liquor per week       Comment: socially    Drug use: Never         Family History          Family History   Problem Relation Name Age of Onset    Aneurysm Mother             Surgical History         Past Surgical History:   Procedure Laterality Date    HERNIA REPAIR   08/18/2015     Inguinal Hernia Repair    IR INJECTION JOINT   03/10/2023     IR INJECTION JOINT POR DIAGRAD    JOINT REPLACEMENT Left 08/01/2023            REVIEW OF SYSTEMS     + as noted above in  HPI.         Physical examination:   On General Observation: Patient is a pleasant, cooperative, well developed 64 y.o.  adult male. The patient is alert and oriented to time, place and person. Patient has normal affect and mood.  Ht 1.829 m (6')   Wt 87.5 kg (193 lb)   BMI 26.18 kg/m²      Vascular:  DP and PT pulses are 2/4 b/l.  no edema noted. mild varicosities b/l.  CFT  6 seconds to all digits bilateral.  Skin temperature is warm to warm from proximal to distal bilateral.       Muscular: Strength is 5/5 b/l.  Motor strength is normal and symmetric proximally, as well as distally, in all four extremities. Good mobility of all extremities.  Tenderness to 2nd toes.      Neuro:  Proprioception present.   Sensation to vibration is  present. Protective sensation present  at all pedal sites via [place] Cynthia 5.07 monofilament bilateral.  Light touch decreased 2nd toes bilateral.      Derm:  Maceration is resolved left foot 4th webspace, slight redness is present to the dorsal mpj, no odor or drainage present     Ortho:  ROM is stable 1st mpj, mtj, stj, aj.     ASSESSMENT:      Interdigital tinea pedis   Cellulitis left 5th toe    PLAN:   Exam  A comprehensive history and physical examination were preformed. The patient was educated on clinical findings, diagnosis and treatment plans. Patient understands all that has been explained and all questions were answered to apparent satisfaction.     Dry well between the toes, will stop the lotrimin and rx for augmentin and mupirocin, used bid, topical wound care, follow up 2 weeks, callif any issues.           Drake Parsons DPM

## 2024-11-25 ENCOUNTER — APPOINTMENT (OUTPATIENT)
Dept: PODIATRY | Facility: CLINIC | Age: 64
End: 2024-11-25
Payer: COMMERCIAL

## 2024-11-25 DIAGNOSIS — L03.032 CELLULITIS OF LEFT TOE: Primary | ICD-10-CM

## 2024-11-25 PROCEDURE — 99212 OFFICE O/P EST SF 10 MIN: CPT | Performed by: PODIATRIST

## 2024-11-25 PROCEDURE — 1036F TOBACCO NON-USER: CPT | Performed by: PODIATRIST

## 2024-11-25 NOTE — PROGRESS NOTES
CC: cellulitis foot     HPI:  Patient seen follow up infection foot, he stopped the augmentin after 2 days, was worried about side effects, did not have nay, used the mupirocin ointment as well.     PCP: Dr. James  Last visit: 9-19-24      PMH  Medical History           Past Medical History:   Diagnosis Date    Anxiety disorder, unspecified 12/04/2013     Anxiety disorder         MEDS     Current Medications      Current Outpatient Medications:     cholecalciferol (Vitamin D3) 25 MCG (1000 UT) tablet, Take 1 tablet (1,000 Units) by mouth once daily., Disp: , Rfl:     cyanocobalamin (Vitamin B-12) 1,000 mcg tablet, Take 1 tablet (1,000 mcg) by mouth once daily., Disp: , Rfl:     escitalopram (Lexapro) 20 mg tablet, TAKE 1 TABLET DAILY, Disp: 90 tablet, Rfl: 3    metoprolol succinate XL (Toprol-XL) 50 mg 24 hr tablet, Take 1 tablet (50 mg) by mouth once daily. Do not crush or chew., Disp: 90 tablet, Rfl: 2    pantoprazole (ProtoNix) 40 mg EC tablet, TAKE 1 TABLET BY MOUTH ONCE DAILY AS NEEDED FOR HEARTBURN, Disp: 30 tablet, Rfl: 0    rosuvastatin (Crestor) 5 mg tablet, TAKE 1 TABLET ONCE DAILY (FOLLOW UP APPOINTMENT/LABS FOR FURTHER REFILLS), Disp: 90 tablet, Rfl: 3      Allergies  Allergies   No Known Allergies      Social History   Social History                Socioeconomic History    Marital status:    Tobacco Use    Smoking status: Never    Smokeless tobacco: Never   Substance and Sexual Activity    Alcohol use: Yes       Alcohol/week: 2.0 standard drinks of alcohol       Types: 1 Cans of beer, 1 Shots of liquor per week       Comment: socially    Drug use: Never         Family History               Family History   Problem Relation Name Age of Onset    Aneurysm Mother             Surgical History             Past Surgical History:   Procedure Laterality Date    HERNIA REPAIR   08/18/2015     Inguinal Hernia Repair    IR INJECTION JOINT   03/10/2023     IR INJECTION JOINT POR DIAGRAD    JOINT REPLACEMENT  Left 08/01/2023            REVIEW OF SYSTEMS     + as noted above in HPI.         Physical examination:   On General Observation: Patient is a pleasant, cooperative, well developed 64 y.o.  adult male. The patient is alert and oriented to time, place and person. Patient has normal affect and mood.  Ht 1.829 m (6')   Wt 87.5 kg (193 lb)   BMI 26.18 kg/m²      Vascular:  DP and PT pulses are 2/4 b/l.  no edema noted. mild varicosities b/l.  CFT  6 seconds to all digits bilateral.  Skin temperature is warm to warm from proximal to distal bilateral.       Muscular: Strength is 5/5 b/l.  Motor strength is normal and symmetric proximally, as well as distally, in all four extremities. Good mobility of all extremities.  Tenderness to 2nd toes.      Neuro:  Proprioception present.   Sensation to vibration is  present. Protective sensation present  at all pedal sites via [place] Cynthia 5.07 monofilament bilateral.  Light touch decreased 2nd toes bilateral.      Derm:  Maceration is resolved left foot 4th webspace, slight redness is present to the dorsal mpj, no odor or drainage present     Ortho:  ROM is stable 1st mpj, mtj, stj, aj.     ASSESSMENT:         Cellulitis left 5th toe     PLAN:   Exam  A comprehensive history and physical examination were preformed. The patient was educated on clinical findings, diagnosis and treatment plans. Patient understands all that has been explained and all questions were answered to apparent satisfaction.      He will finish the antibiotics, will discuss the neuropathy when follows up           Drake Parsons DPM

## 2024-12-09 ENCOUNTER — APPOINTMENT (OUTPATIENT)
Dept: PODIATRY | Facility: CLINIC | Age: 64
End: 2024-12-09
Payer: COMMERCIAL

## 2024-12-11 ENCOUNTER — APPOINTMENT (OUTPATIENT)
Dept: PODIATRY | Facility: CLINIC | Age: 64
End: 2024-12-11
Payer: COMMERCIAL

## 2024-12-11 DIAGNOSIS — G62.9 NEUROPATHY: ICD-10-CM

## 2024-12-11 DIAGNOSIS — L03.032 CELLULITIS OF LEFT TOE: Primary | ICD-10-CM

## 2024-12-11 PROCEDURE — 99212 OFFICE O/P EST SF 10 MIN: CPT | Performed by: PODIATRIST

## 2024-12-11 PROCEDURE — 1036F TOBACCO NON-USER: CPT | Performed by: PODIATRIST

## 2024-12-11 NOTE — PROGRESS NOTES
CC: cellulitis foot     HPI:  Patient seen follow up infection foot, he finished the antibiotics.  PCP: Dr. James  Last visit: 9-19-24      PMH  Medical History           Past Medical History:   Diagnosis Date    Anxiety disorder, unspecified 12/04/2013     Anxiety disorder         MEDS     Current Medications      Current Outpatient Medications:     cholecalciferol (Vitamin D3) 25 MCG (1000 UT) tablet, Take 1 tablet (1,000 Units) by mouth once daily., Disp: , Rfl:     cyanocobalamin (Vitamin B-12) 1,000 mcg tablet, Take 1 tablet (1,000 mcg) by mouth once daily., Disp: , Rfl:     escitalopram (Lexapro) 20 mg tablet, TAKE 1 TABLET DAILY, Disp: 90 tablet, Rfl: 3    metoprolol succinate XL (Toprol-XL) 50 mg 24 hr tablet, Take 1 tablet (50 mg) by mouth once daily. Do not crush or chew., Disp: 90 tablet, Rfl: 2    pantoprazole (ProtoNix) 40 mg EC tablet, TAKE 1 TABLET BY MOUTH ONCE DAILY AS NEEDED FOR HEARTBURN, Disp: 30 tablet, Rfl: 0    rosuvastatin (Crestor) 5 mg tablet, TAKE 1 TABLET ONCE DAILY (FOLLOW UP APPOINTMENT/LABS FOR FURTHER REFILLS), Disp: 90 tablet, Rfl: 3      Allergies  Allergies   No Known Allergies      Social History   Social History                Socioeconomic History    Marital status:    Tobacco Use    Smoking status: Never    Smokeless tobacco: Never   Substance and Sexual Activity    Alcohol use: Yes       Alcohol/week: 2.0 standard drinks of alcohol       Types: 1 Cans of beer, 1 Shots of liquor per week       Comment: socially    Drug use: Never         Family History               Family History   Problem Relation Name Age of Onset    Aneurysm Mother             Surgical History             Past Surgical History:   Procedure Laterality Date    HERNIA REPAIR   08/18/2015     Inguinal Hernia Repair    IR INJECTION JOINT   03/10/2023     IR INJECTION JOINT POR DIAGRAD    JOINT REPLACEMENT Left 08/01/2023            REVIEW OF SYSTEMS     + as noted above in HPI.         Physical  examination:   On General Observation: Patient is a pleasant, cooperative, well developed 64 y.o.  adult male. The patient is alert and oriented to time, place and person. Patient has normal affect and mood.  Ht 1.829 m (6')   Wt 87.5 kg (193 lb)   BMI 26.18 kg/m²      Vascular:  DP and PT pulses are 2/4 b/l.  no edema noted. mild varicosities b/l.  CFT  6 seconds to all digits bilateral.  Skin temperature is warm to warm from proximal to distal bilateral.       Muscular: Strength is 5/5 b/l.  Motor strength is normal and symmetric proximally, as well as distally, in all four extremities. Good mobility of all extremities.  Tenderness to 2nd toes.      Neuro:  Proprioception present.   Sensation to vibration is  present. Protective sensation present  at all pedal sites via [place] Cynthia 5.07 monofilament bilateral.  Light touch decreased 2nd toes bilateral.      Derm:  Maceration is resolved left foot 4th webspace, minimal redness is present to the dorsal mpj, no odor or drainage present     Ortho:  ROM is stable 1st mpj, mtj, stj, aj.     ASSESSMENT:          Cellulitis left 5th toe  Neuropathy     PLAN:   Exam  A comprehensive history and physical examination were preformed. The patient was educated on clinical findings, diagnosis and treatment plans. Patient understands all that has been explained and all questions were answered to apparent satisfaction.    Infection resolved, can try capscacin and voltaren gel as needed.           Drake Parsons DPM

## 2025-03-13 DIAGNOSIS — E78.5 HYPERLIPIDEMIA, UNSPECIFIED HYPERLIPIDEMIA TYPE: ICD-10-CM

## 2025-03-13 RX ORDER — ROSUVASTATIN CALCIUM 5 MG/1
TABLET, COATED ORAL
Qty: 90 TABLET | Refills: 3 | Status: SHIPPED | OUTPATIENT
Start: 2025-03-13

## 2025-04-04 DIAGNOSIS — F41.9 ANXIETY: ICD-10-CM

## 2025-04-04 RX ORDER — ESCITALOPRAM OXALATE 20 MG/1
TABLET ORAL
Qty: 90 TABLET | Refills: 3 | Status: SHIPPED | OUTPATIENT
Start: 2025-04-04

## 2025-06-05 DIAGNOSIS — I25.10 CORONARY ARTERY DISEASE INVOLVING NATIVE HEART, UNSPECIFIED VESSEL OR LESION TYPE, UNSPECIFIED WHETHER ANGINA PRESENT: ICD-10-CM

## 2025-06-05 RX ORDER — METOPROLOL SUCCINATE 50 MG/1
50 TABLET, EXTENDED RELEASE ORAL DAILY
Qty: 90 TABLET | Refills: 0 | Status: SHIPPED | OUTPATIENT
Start: 2025-06-05

## 2025-08-04 ENCOUNTER — OFFICE VISIT (OUTPATIENT)
Dept: CARDIOLOGY | Facility: CLINIC | Age: 65
End: 2025-08-04
Payer: COMMERCIAL

## 2025-08-04 VITALS
SYSTOLIC BLOOD PRESSURE: 147 MMHG | HEIGHT: 71 IN | HEART RATE: 73 BPM | WEIGHT: 194 LBS | DIASTOLIC BLOOD PRESSURE: 77 MMHG | BODY MASS INDEX: 27.16 KG/M2

## 2025-08-04 DIAGNOSIS — E78.1 HYPERTRIGLYCERIDEMIA: ICD-10-CM

## 2025-08-04 DIAGNOSIS — E78.2 MIXED HYPERLIPIDEMIA: ICD-10-CM

## 2025-08-04 DIAGNOSIS — I47.29 NSVT (NONSUSTAINED VENTRICULAR TACHYCARDIA) (MULTI): ICD-10-CM

## 2025-08-04 DIAGNOSIS — I25.10 CORONARY ARTERY DISEASE INVOLVING NATIVE HEART, UNSPECIFIED VESSEL OR LESION TYPE, UNSPECIFIED WHETHER ANGINA PRESENT: Primary | ICD-10-CM

## 2025-08-04 LAB
ATRIAL RATE: 69 BPM
P AXIS: 51 DEGREES
P OFFSET: 183 MS
P ONSET: 128 MS
PR INTERVAL: 156 MS
Q ONSET: 206 MS
QRS COUNT: 11 BEATS
QRS DURATION: 104 MS
QT INTERVAL: 430 MS
QTC CALCULATION(BAZETT): 460 MS
QTC FREDERICIA: 450 MS
R AXIS: -11 DEGREES
T AXIS: 38 DEGREES
T OFFSET: 421 MS
VENTRICULAR RATE: 69 BPM

## 2025-08-04 PROCEDURE — 1036F TOBACCO NON-USER: CPT | Performed by: NURSE PRACTITIONER

## 2025-08-04 PROCEDURE — 93005 ELECTROCARDIOGRAM TRACING: CPT | Performed by: NURSE PRACTITIONER

## 2025-08-04 PROCEDURE — 1160F RVW MEDS BY RX/DR IN RCRD: CPT | Performed by: NURSE PRACTITIONER

## 2025-08-04 PROCEDURE — 3077F SYST BP >= 140 MM HG: CPT | Performed by: NURSE PRACTITIONER

## 2025-08-04 PROCEDURE — 99214 OFFICE O/P EST MOD 30 MIN: CPT | Performed by: NURSE PRACTITIONER

## 2025-08-04 PROCEDURE — 1159F MED LIST DOCD IN RCRD: CPT | Performed by: NURSE PRACTITIONER

## 2025-08-04 PROCEDURE — 3078F DIAST BP <80 MM HG: CPT | Performed by: NURSE PRACTITIONER

## 2025-08-04 PROCEDURE — 1123F ACP DISCUSS/DSCN MKR DOCD: CPT | Performed by: NURSE PRACTITIONER

## 2025-08-04 PROCEDURE — 99212 OFFICE O/P EST SF 10 MIN: CPT

## 2025-08-04 PROCEDURE — 3008F BODY MASS INDEX DOCD: CPT | Performed by: NURSE PRACTITIONER

## 2025-08-04 RX ORDER — ROSUVASTATIN CALCIUM 5 MG/1
5 TABLET, COATED ORAL DAILY
Qty: 90 TABLET | Refills: 3 | Status: SHIPPED | OUTPATIENT
Start: 2025-08-04 | End: 2026-08-04

## 2025-08-04 RX ORDER — METOPROLOL SUCCINATE 50 MG/1
50 TABLET, EXTENDED RELEASE ORAL DAILY
Qty: 90 TABLET | Refills: 3 | Status: SHIPPED | OUTPATIENT
Start: 2025-08-04

## 2025-08-04 NOTE — PROGRESS NOTES
"Chief Complaint:   CAD     History Of Present Illness:    Shankar Torres is a 65 y.o. male here with for follow-up of coronary artery disease.  He was noted to have minimal CAD in 2009. Cath in 2019 showed moderate, non obstructive CAD. The patient exercises regularly (walks and ride stationary bike) and follows a heart healthy diet.  The patient has been well since their last office appointment and is not having any anginal symptoms or dyspnea on exertion.      He self stopped crestor due to hearing bad things about it.    He is also not taking his metoprolol daily.     Nonsustained VT [RFA] - 9/17/2019  Cath (Moderate non-obstructive CAD) - 7/23/2019  Cath (Normal EF, Minimal CAD) - 12/17/2009  Past Medical History:  He has a past medical history of Anxiety disorder, unspecified (12/04/2013).    Past Surgical History:  He has a past surgical history that includes Hernia repair (08/18/2015); IR injection joint (03/10/2023); and Joint replacement (Left, 08/01/2023).      Social History:  He reports that he has never smoked. He has never used smokeless tobacco. He reports current alcohol use of about 2.0 standard drinks of alcohol per week. He reports that he does not use drugs.    Family History:  Family History   Problem Relation Name Age of Onset    Aneurysm Mother        Allergies:  Patient has no known allergies.    Review of Systems  All pertinent systems have been reviewed and are negative except for what is stated in the history of present illness.    All other systems have been reviewed and are negative and noncontributory to this patient's current ailments.     Visit Vitals  /77 (BP Location: Right arm)   Pulse 73   Ht 1.803 m (5' 11\")   Wt 88 kg (194 lb)   BMI 27.06 kg/m²   Smoking Status Never   BSA 2.1 m²       Last Labs:  CBC -  Lab Results   Component Value Date    WBC 4.3 (L) 04/23/2024    HGB 14.7 04/23/2024    HCT 45.6 04/23/2024     (H) 04/23/2024     04/23/2024       CMP " -  Lab Results   Component Value Date    CALCIUM 9.1 04/23/2024    PROT 7.5 07/24/2023    ALBUMIN 4.5 07/24/2023    AST 20 07/24/2023    ALT 17 04/23/2024    ALKPHOS 77 07/24/2023    BILITOT 0.5 07/24/2023    BUN 11 04/23/2024    CREATININE 0.73 04/23/2024       LIPID PANEL -   Lab Results   Component Value Date    CHOL 212 (H) 04/23/2024    TRIG 96 04/23/2024    HDL 89.1 04/23/2024    CHHDL 2.4 04/23/2024    LDLF 103 (H) 12/21/2022    VLDL 19 04/23/2024    NHDL 123 04/23/2024       RENAL FUNCTION PANEL -   Lab Results   Component Value Date    GLUCOSE 82 04/23/2024     04/23/2024    K 4.6 04/23/2024     04/23/2024    CO2 31 04/23/2024    ANIONGAP 10 04/23/2024    BUN 11 04/23/2024    CREATININE 0.73 04/23/2024    GFRMALE >90 12/21/2022    CALCIUM 9.1 04/23/2024    ALBUMIN 4.5 07/24/2023        Lab Results   Component Value Date    HGBA1C 6.1 (H) 04/23/2024       Objective   Vitals reviewed.   Constitutional:       Appearance: Healthy appearance. Not in distress.   Eyes:      Conjunctiva/sclera: Conjunctivae normal.   Neck:      Vascular: No JVR. JVD normal.   Pulmonary:      Effort: Pulmonary effort is normal.      Breath sounds: Normal breath sounds. No wheezing. No rhonchi. No rales.   Chest:      Chest wall: Not tender to palpatation.   Cardiovascular:      PMI at left midclavicular line. Normal rate. Regular rhythm. Normal S1. Normal S2.       Murmurs: There is no murmur.      No gallop.  No click. No rub.   Edema:     Peripheral edema absent.   Abdominal:      General: Bowel sounds are normal.      Palpations: Abdomen is soft.      Tenderness: There is no abdominal tenderness.   Musculoskeletal: Normal range of motion.         General: No tenderness. Skin:     General: Skin is warm and dry.   Neurological:      General: No focal deficit present.      Mental Status: Alert and oriented to person, place and time.   Psychiatric:         Attention and Perception: Attention normal.         Mood and  Affect: Mood normal.     Assessment/Plan   Diagnoses and all orders for this visit:  Coronary artery disease involving native heart, unspecified vessel or lesion type, unspecified whether angina present  - Exercising regularly without complaint  - EKG NSR at 69bpm, No ST elevation  - education provided on importance of crestor in preventing future plaque from building up. Discussed importance of medication compliance and to contact me with concerns of medication. Discussed not everything presented on statins is true or was not a good study.  NSVT (nonsustained ventricular tachycardia) (Multi)  - rare palpitations  - not taking metoprolol  - education provided on importance of consistency of taking this medication   Hypertriglyceridemia  - off vascepa  - trigs well controlled last year, will recheck cholesterol once he is back on statin   Mixed hyperlipidemia  - check lipids once he is back on crestor   - restart crestor     Follow up 1 year     Outpatient Medications:  Current Outpatient Medications   Medication Instructions    cholecalciferol (VITAMIN D3) 1,000 Units, Daily    cyanocobalamin (VITAMIN B-12) 1,000 mcg, Daily    escitalopram (Lexapro) 20 mg tablet TAKE 1 TABLET DAILY    metoprolol succinate XL (TOPROL-XL) 50 mg, oral, Daily, Do not crush or chew    pantoprazole (ProtoNix) 40 mg EC tablet TAKE 1 TABLET BY MOUTH ONCE DAILY AS NEEDED FOR HEARTBURN    rosuvastatin (CRESTOR) 5 mg, oral, Daily     Exclusive of any other services or procedures performed, Zo KOWALSKI, spent 30 minutes in duration for this visit today.  This time consisted of chart review, obtaining history, and/or performing the exam as documented above, as well as, documenting clinical information for the encounter in the electronic record.
